# Patient Record
Sex: FEMALE | Race: BLACK OR AFRICAN AMERICAN | NOT HISPANIC OR LATINO | Employment: FULL TIME | URBAN - METROPOLITAN AREA
[De-identification: names, ages, dates, MRNs, and addresses within clinical notes are randomized per-mention and may not be internally consistent; named-entity substitution may affect disease eponyms.]

---

## 2020-12-02 ENCOUNTER — LAB VISIT (OUTPATIENT)
Dept: PRIMARY CARE CLINIC | Facility: OTHER | Age: 30
End: 2020-12-02
Attending: INTERNAL MEDICINE
Payer: COMMERCIAL

## 2020-12-02 DIAGNOSIS — Z03.818 ENCOUNTER FOR OBSERVATION FOR SUSPECTED EXPOSURE TO OTHER BIOLOGICAL AGENTS RULED OUT: ICD-10-CM

## 2020-12-02 PROCEDURE — U0003 INFECTIOUS AGENT DETECTION BY NUCLEIC ACID (DNA OR RNA); SEVERE ACUTE RESPIRATORY SYNDROME CORONAVIRUS 2 (SARS-COV-2) (CORONAVIRUS DISEASE [COVID-19]), AMPLIFIED PROBE TECHNIQUE, MAKING USE OF HIGH THROUGHPUT TECHNOLOGIES AS DESCRIBED BY CMS-2020-01-R: HCPCS

## 2020-12-03 LAB — SARS-COV-2 RNA RESP QL NAA+PROBE: NOT DETECTED

## 2020-12-04 ENCOUNTER — LAB VISIT (OUTPATIENT)
Dept: PRIMARY CARE CLINIC | Facility: OTHER | Age: 30
End: 2020-12-04
Attending: INTERNAL MEDICINE
Payer: OTHER GOVERNMENT

## 2020-12-04 DIAGNOSIS — Z03.818 ENCOUNTER FOR OBSERVATION FOR SUSPECTED EXPOSURE TO OTHER BIOLOGICAL AGENTS RULED OUT: ICD-10-CM

## 2020-12-04 PROCEDURE — U0003 INFECTIOUS AGENT DETECTION BY NUCLEIC ACID (DNA OR RNA); SEVERE ACUTE RESPIRATORY SYNDROME CORONAVIRUS 2 (SARS-COV-2) (CORONAVIRUS DISEASE [COVID-19]), AMPLIFIED PROBE TECHNIQUE, MAKING USE OF HIGH THROUGHPUT TECHNOLOGIES AS DESCRIBED BY CMS-2020-01-R: HCPCS

## 2020-12-07 LAB — SARS-COV-2 RNA RESP QL NAA+PROBE: NOT DETECTED

## 2021-01-13 ENCOUNTER — LAB VISIT (OUTPATIENT)
Dept: PRIMARY CARE CLINIC | Facility: OTHER | Age: 31
End: 2021-01-13
Attending: INTERNAL MEDICINE
Payer: OTHER GOVERNMENT

## 2021-01-13 DIAGNOSIS — Z20.822 ENCOUNTER FOR LABORATORY TESTING FOR COVID-19 VIRUS: ICD-10-CM

## 2021-01-13 PROCEDURE — U0003 INFECTIOUS AGENT DETECTION BY NUCLEIC ACID (DNA OR RNA); SEVERE ACUTE RESPIRATORY SYNDROME CORONAVIRUS 2 (SARS-COV-2) (CORONAVIRUS DISEASE [COVID-19]), AMPLIFIED PROBE TECHNIQUE, MAKING USE OF HIGH THROUGHPUT TECHNOLOGIES AS DESCRIBED BY CMS-2020-01-R: HCPCS

## 2021-01-14 LAB — SARS-COV-2 RNA RESP QL NAA+PROBE: NOT DETECTED

## 2021-03-30 ENCOUNTER — TELEPHONE (OUTPATIENT)
Dept: FAMILY MEDICINE | Facility: CLINIC | Age: 31
End: 2021-03-30

## 2021-03-30 DIAGNOSIS — Z79.899 ENCOUNTER FOR LONG-TERM (CURRENT) USE OF OTHER MEDICATIONS: Primary | ICD-10-CM

## 2021-03-30 DIAGNOSIS — Z13.89 SCREENING FOR BLOOD OR PROTEIN IN URINE: ICD-10-CM

## 2021-03-30 DIAGNOSIS — Z13.29 SCREENING FOR ENDOCRINE DISORDER: ICD-10-CM

## 2021-03-30 DIAGNOSIS — Z13.6 SCREENING FOR ISCHEMIC HEART DISEASE (IHD): ICD-10-CM

## 2021-04-29 ENCOUNTER — PATIENT MESSAGE (OUTPATIENT)
Dept: RESEARCH | Facility: HOSPITAL | Age: 31
End: 2021-04-29

## 2021-05-26 ENCOUNTER — TELEPHONE (OUTPATIENT)
Dept: FAMILY MEDICINE | Facility: CLINIC | Age: 31
End: 2021-05-26

## 2021-06-07 ENCOUNTER — OFFICE VISIT (OUTPATIENT)
Dept: FAMILY MEDICINE | Facility: CLINIC | Age: 31
End: 2021-06-07
Payer: COMMERCIAL

## 2021-06-07 VITALS
SYSTOLIC BLOOD PRESSURE: 124 MMHG | OXYGEN SATURATION: 100 % | BODY MASS INDEX: 32.99 KG/M2 | DIASTOLIC BLOOD PRESSURE: 80 MMHG | WEIGHT: 198 LBS | HEART RATE: 74 BPM | HEIGHT: 65 IN

## 2021-06-07 DIAGNOSIS — L30.9 ECZEMA, UNSPECIFIED TYPE: ICD-10-CM

## 2021-06-07 DIAGNOSIS — Z00.00 ANNUAL PHYSICAL EXAM: Primary | ICD-10-CM

## 2021-06-07 DIAGNOSIS — J30.1 SEASONAL ALLERGIC RHINITIS DUE TO POLLEN: ICD-10-CM

## 2021-06-07 DIAGNOSIS — Z76.89 ESTABLISHING CARE WITH NEW DOCTOR, ENCOUNTER FOR: ICD-10-CM

## 2021-06-07 PROCEDURE — 3008F PR BODY MASS INDEX (BMI) DOCUMENTED: ICD-10-PCS | Mod: S$GLB,,, | Performed by: FAMILY MEDICINE

## 2021-06-07 PROCEDURE — 3008F BODY MASS INDEX DOCD: CPT | Mod: S$GLB,,, | Performed by: FAMILY MEDICINE

## 2021-06-07 PROCEDURE — 99385 PR PREVENTIVE VISIT,NEW,18-39: ICD-10-PCS | Mod: S$GLB,,, | Performed by: FAMILY MEDICINE

## 2021-06-07 PROCEDURE — 99385 PREV VISIT NEW AGE 18-39: CPT | Mod: S$GLB,,, | Performed by: FAMILY MEDICINE

## 2022-01-10 ENCOUNTER — LAB VISIT (OUTPATIENT)
Dept: PRIMARY CARE CLINIC | Facility: OTHER | Age: 32
End: 2022-01-10
Attending: INTERNAL MEDICINE
Payer: COMMERCIAL

## 2022-01-10 DIAGNOSIS — Z20.822 ENCOUNTER FOR LABORATORY TESTING FOR COVID-19 VIRUS: ICD-10-CM

## 2022-01-10 PROCEDURE — U0003 INFECTIOUS AGENT DETECTION BY NUCLEIC ACID (DNA OR RNA); SEVERE ACUTE RESPIRATORY SYNDROME CORONAVIRUS 2 (SARS-COV-2) (CORONAVIRUS DISEASE [COVID-19]), AMPLIFIED PROBE TECHNIQUE, MAKING USE OF HIGH THROUGHPUT TECHNOLOGIES AS DESCRIBED BY CMS-2020-01-R: HCPCS | Performed by: INTERNAL MEDICINE

## 2022-01-11 LAB
SARS-COV-2 RNA RESP QL NAA+PROBE: NOT DETECTED
SARS-COV-2- CYCLE NUMBER: NORMAL

## 2022-06-06 ENCOUNTER — TELEPHONE (OUTPATIENT)
Dept: FAMILY MEDICINE | Facility: CLINIC | Age: 32
End: 2022-06-06

## 2022-06-06 DIAGNOSIS — Z00.00 ANNUAL PHYSICAL EXAM: Primary | ICD-10-CM

## 2022-06-06 DIAGNOSIS — E66.9 OBESITY, UNSPECIFIED CLASSIFICATION, UNSPECIFIED OBESITY TYPE, UNSPECIFIED WHETHER SERIOUS COMORBIDITY PRESENT: ICD-10-CM

## 2022-06-06 NOTE — TELEPHONE ENCOUNTER
----- Message from Jasmin Rowan sent at 2022  8:45 AM CDT -----  Pt calling she needs her labs to be ordered to do before her appt the current labs she thinks are  [facility] since it has been more than a year. She needs a call back so she knows when to come in [phone]

## 2022-06-08 LAB
ALBUMIN SERPL-MCNC: 4.3 G/DL (ref 3.6–5.1)
ALBUMIN/GLOB SERPL: 1.9 (CALC) (ref 1–2.5)
ALP SERPL-CCNC: 76 U/L (ref 31–125)
ALT SERPL-CCNC: 19 U/L (ref 6–29)
APPEARANCE UR: CLEAR
AST SERPL-CCNC: 18 U/L (ref 10–30)
BACTERIA #/AREA URNS HPF: NORMAL /HPF
BACTERIA UR CULT: NORMAL
BASOPHILS # BLD AUTO: 47 CELLS/UL (ref 0–200)
BASOPHILS NFR BLD AUTO: 0.6 %
BILIRUB SERPL-MCNC: 0.5 MG/DL (ref 0.2–1.2)
BILIRUB UR QL STRIP: NEGATIVE
BUN SERPL-MCNC: 14 MG/DL (ref 7–25)
BUN/CREAT SERPL: NORMAL (CALC) (ref 6–22)
CALCIUM SERPL-MCNC: 9 MG/DL (ref 8.6–10.2)
CHLORIDE SERPL-SCNC: 106 MMOL/L (ref 98–110)
CHOLEST SERPL-MCNC: 148 MG/DL
CHOLEST/HDLC SERPL: 1.9 (CALC)
CO2 SERPL-SCNC: 27 MMOL/L (ref 20–32)
COLOR UR: YELLOW
CREAT SERPL-MCNC: 0.75 MG/DL (ref 0.5–1.1)
EOSINOPHIL # BLD AUTO: 198 CELLS/UL (ref 15–500)
EOSINOPHIL NFR BLD AUTO: 2.5 %
ERYTHROCYTE [DISTWIDTH] IN BLOOD BY AUTOMATED COUNT: 12.7 % (ref 11–15)
GLOBULIN SER CALC-MCNC: 2.3 G/DL (CALC) (ref 1.9–3.7)
GLUCOSE SERPL-MCNC: 81 MG/DL (ref 65–99)
GLUCOSE UR QL STRIP: NEGATIVE
HCT VFR BLD AUTO: 41 % (ref 35–45)
HDLC SERPL-MCNC: 77 MG/DL
HGB BLD-MCNC: 13.3 G/DL (ref 11.7–15.5)
HGB UR QL STRIP: NEGATIVE
HYALINE CASTS #/AREA URNS LPF: NORMAL /LPF
KETONES UR QL STRIP: NEGATIVE
LDLC SERPL CALC-MCNC: 58 MG/DL (CALC)
LEUKOCYTE ESTERASE UR QL STRIP: NEGATIVE
LYMPHOCYTES # BLD AUTO: 1438 CELLS/UL (ref 850–3900)
LYMPHOCYTES NFR BLD AUTO: 18.2 %
MCH RBC QN AUTO: 29.6 PG (ref 27–33)
MCHC RBC AUTO-ENTMCNC: 32.4 G/DL (ref 32–36)
MCV RBC AUTO: 91.3 FL (ref 80–100)
MONOCYTES # BLD AUTO: 679 CELLS/UL (ref 200–950)
MONOCYTES NFR BLD AUTO: 8.6 %
NEUTROPHILS # BLD AUTO: 5538 CELLS/UL (ref 1500–7800)
NEUTROPHILS NFR BLD AUTO: 70.1 %
NITRITE UR QL STRIP: NEGATIVE
NONHDLC SERPL-MCNC: 71 MG/DL (CALC)
PH UR STRIP: 6 [PH] (ref 5–8)
PLATELET # BLD AUTO: 237 THOUSAND/UL (ref 140–400)
PMV BLD REES-ECKER: 9.3 FL (ref 7.5–12.5)
POTASSIUM SERPL-SCNC: 4 MMOL/L (ref 3.5–5.3)
PROT SERPL-MCNC: 6.6 G/DL (ref 6.1–8.1)
PROT UR QL STRIP: NEGATIVE
RBC # BLD AUTO: 4.49 MILLION/UL (ref 3.8–5.1)
RBC #/AREA URNS HPF: NORMAL /HPF
SODIUM SERPL-SCNC: 139 MMOL/L (ref 135–146)
SP GR UR STRIP: 1.01 (ref 1–1.03)
SQUAMOUS #/AREA URNS HPF: NORMAL /HPF
TRIGL SERPL-MCNC: 46 MG/DL
TSH SERPL-ACNC: 1.02 MIU/L
WBC # BLD AUTO: 7.9 THOUSAND/UL (ref 3.8–10.8)
WBC #/AREA URNS HPF: NORMAL /HPF

## 2022-06-09 ENCOUNTER — OFFICE VISIT (OUTPATIENT)
Dept: FAMILY MEDICINE | Facility: CLINIC | Age: 32
End: 2022-06-09
Payer: COMMERCIAL

## 2022-06-09 VITALS
OXYGEN SATURATION: 97 % | BODY MASS INDEX: 34.82 KG/M2 | HEART RATE: 88 BPM | SYSTOLIC BLOOD PRESSURE: 116 MMHG | HEIGHT: 65 IN | DIASTOLIC BLOOD PRESSURE: 74 MMHG | WEIGHT: 209 LBS

## 2022-06-09 DIAGNOSIS — L30.9 ECZEMA, UNSPECIFIED TYPE: ICD-10-CM

## 2022-06-09 DIAGNOSIS — J30.1 SEASONAL ALLERGIC RHINITIS DUE TO POLLEN: ICD-10-CM

## 2022-06-09 DIAGNOSIS — F43.22 ADJUSTMENT DISORDER WITH ANXIETY: ICD-10-CM

## 2022-06-09 DIAGNOSIS — M25.572 ACUTE LEFT ANKLE PAIN: ICD-10-CM

## 2022-06-09 DIAGNOSIS — Z00.00 ANNUAL PHYSICAL EXAM: Primary | ICD-10-CM

## 2022-06-09 PROCEDURE — 3074F PR MOST RECENT SYSTOLIC BLOOD PRESSURE < 130 MM HG: ICD-10-PCS | Mod: CPTII,S$GLB,, | Performed by: FAMILY MEDICINE

## 2022-06-09 PROCEDURE — 3074F SYST BP LT 130 MM HG: CPT | Mod: CPTII,S$GLB,, | Performed by: FAMILY MEDICINE

## 2022-06-09 PROCEDURE — 3078F PR MOST RECENT DIASTOLIC BLOOD PRESSURE < 80 MM HG: ICD-10-PCS | Mod: CPTII,S$GLB,, | Performed by: FAMILY MEDICINE

## 2022-06-09 PROCEDURE — 99395 PR PREVENTIVE VISIT,EST,18-39: ICD-10-PCS | Mod: S$GLB,,, | Performed by: FAMILY MEDICINE

## 2022-06-09 PROCEDURE — 1160F RVW MEDS BY RX/DR IN RCRD: CPT | Mod: CPTII,S$GLB,, | Performed by: FAMILY MEDICINE

## 2022-06-09 PROCEDURE — 99395 PREV VISIT EST AGE 18-39: CPT | Mod: S$GLB,,, | Performed by: FAMILY MEDICINE

## 2022-06-09 PROCEDURE — 3078F DIAST BP <80 MM HG: CPT | Mod: CPTII,S$GLB,, | Performed by: FAMILY MEDICINE

## 2022-06-09 PROCEDURE — 1160F PR REVIEW ALL MEDS BY PRESCRIBER/CLIN PHARMACIST DOCUMENTED: ICD-10-PCS | Mod: CPTII,S$GLB,, | Performed by: FAMILY MEDICINE

## 2022-06-09 PROCEDURE — 1159F MED LIST DOCD IN RCRD: CPT | Mod: CPTII,S$GLB,, | Performed by: FAMILY MEDICINE

## 2022-06-09 PROCEDURE — 3008F BODY MASS INDEX DOCD: CPT | Mod: CPTII,S$GLB,, | Performed by: FAMILY MEDICINE

## 2022-06-09 PROCEDURE — 3008F PR BODY MASS INDEX (BMI) DOCUMENTED: ICD-10-PCS | Mod: CPTII,S$GLB,, | Performed by: FAMILY MEDICINE

## 2022-06-09 PROCEDURE — 1159F PR MEDICATION LIST DOCUMENTED IN MEDICAL RECORD: ICD-10-PCS | Mod: CPTII,S$GLB,, | Performed by: FAMILY MEDICINE

## 2022-06-09 NOTE — PROGRESS NOTES
SUBJECTIVE:   HPI: Sheridan Ribera  is a 32 y.o. female who presents for annual physical .   Annual Exam (Is having a little anxiety, takes no rx meds// SW)    Pt here for annual exam.     Significant PMHx of eczema.    Family is causing her a lot of anxiety.  Helping take care of her grandma. Her JOHN is in town, planning on taking care of her grandmother. Has been sleeping ok. Not working right now, off for the summer. Teaches at [employer].     Eczema hasn't bothered her since she had children, though she occasionally has dry patches.     Needs to exercise and eat better.    Has occasional allergies, takes zyrtec as needed.     Had labs done: TSH 1.02, LDL 58    Pt hurt her left ankle about 2 months ago. The pain is getting better. Doesn't hurt when she walks, only when she step down in a certain way.  ------------------------------------------------------------------------  [facility] mom's sisters (2), ovarian cancer.    Telephone on 06/06/2022   Component Date Value Ref Range Status    WBC 06/07/2022 7.9  3.8 - 10.8 Thousand/uL Final    RBC 06/07/2022 4.49  3.80 - 5.10 Million/uL Final    Hemoglobin 06/07/2022 13.3  11.7 - 15.5 g/dL Final    Hematocrit 06/07/2022 41.0  35.0 - 45.0 % Final    MCV 06/07/2022 91.3  80.0 - 100.0 fL Final    MCH 06/07/2022 29.6  27.0 - 33.0 pg Final    MCHC 06/07/2022 32.4  32.0 - 36.0 g/dL Final    RDW 06/07/2022 12.7  11.0 - 15.0 % Final    Platelets 06/07/2022 237  140 - 400 Thousand/uL Final    MPV 06/07/2022 9.3  7.5 - 12.5 fL Final    Neutrophils, Abs 06/07/2022 5,538  1,500 - 7,800 cells/uL Final    Lymph # 06/07/2022 1,438  850 - 3,900 cells/uL Final    Mono # 06/07/2022 679  200 - 950 cells/uL Final    Eos # 06/07/2022 198  15 - 500 cells/uL Final    Baso # 06/07/2022 47  0 - 200 cells/uL Final    Neutrophils Relative 06/07/2022 70.1  % Final    Lymph % 06/07/2022 18.2  % Final    Mono % 06/07/2022 8.6  % Final    Eosinophil %  06/07/2022 2.5  % Final    Basophil % 06/07/2022 0.6  % Final    Glucose 06/07/2022 81  65 - 99 mg/dL Final    Comment:               Fasting reference interval         BUN 06/07/2022 14  7 - 25 mg/dL Final    Creatinine 06/07/2022 0.75  0.50 - 1.10 mg/dL Final    eGFR if non African American 06/07/2022 105  > OR = 60 mL/min/1.73m2 Final    eGFR if  06/07/2022 121  > OR = 60 mL/min/1.73m2 Final    BUN/Creatinine Ratio 06/07/2022 NOT APPLICABLE  6 - 22 (calc) Final    Sodium 06/07/2022 139  135 - 146 mmol/L Final    Potassium 06/07/2022 4.0  3.5 - 5.3 mmol/L Final    Chloride 06/07/2022 106  98 - 110 mmol/L Final    CO2 06/07/2022 27  20 - 32 mmol/L Final    Calcium 06/07/2022 9.0  8.6 - 10.2 mg/dL Final    Total Protein 06/07/2022 6.6  6.1 - 8.1 g/dL Final    Albumin 06/07/2022 4.3  3.6 - 5.1 g/dL Final    Globulin, Total 06/07/2022 2.3  1.9 - 3.7 g/dL (calc) Final    Albumin/Globulin Ratio 06/07/2022 1.9  1.0 - 2.5 (calc) Final    Total Bilirubin 06/07/2022 0.5  0.2 - 1.2 mg/dL Final    Alkaline Phosphatase 06/07/2022 76  31 - 125 U/L Final    AST 06/07/2022 18  10 - 30 U/L Final    ALT 06/07/2022 19  6 - 29 U/L Final    Cholesterol 06/07/2022 148  <200 mg/dL Final    HDL 06/07/2022 77  > OR = 50 mg/dL Final    Triglycerides 06/07/2022 46  <150 mg/dL Final    LDL Cholesterol 06/07/2022 58  mg/dL (calc) Final    Comment: Reference range: <100     Desirable range <100 mg/dL for primary prevention;    <70 mg/dL for patients with CHD or diabetic patients   with > or = 2 CHD risk factors.     LDL-C is now calculated using the Evan-Rowan   calculation, which is a validated novel method providing   better accuracy than the Friedewald equation in the   estimation of LDL-C.   Evan HUMPHREYS et al. MARGOT. 2013;310(19): 6354-0692   (http://education.Connequity.Google/faq/CJG520)      HDL/Cholesterol Ratio 06/07/2022 1.9  <5.0 (calc) Final    Non HDL Chol. (LDL+VLDL) 06/07/2022 71   <130 mg/dL (calc) Final    Comment: For patients with diabetes plus 1 major ASCVD risk   factor, treating to a non-HDL-C goal of <100 mg/dL   (LDL-C of <70 mg/dL) is considered a therapeutic   option.      TSH w/reflex to FT4 2022 1.02  mIU/L Final    Comment:           Reference Range                         > or = 20 Years  0.40-4.50                              Pregnancy Ranges            First trimester    0.26-2.66            Second trimester   0.55-2.73            Third trimester    0.43-2.91      Color, UA 2022 YELLOW  YELLOW Final    Appearance, UA 2022 CLEAR  CLEAR Final    Specific Gravity, UA 2022 1.009  1.001 - 1.035 Final    pH, UA 2022 6.0  5.0 - 8.0 Final    Glucose, UA 2022 NEGATIVE  NEGATIVE Final    Bilirubin, UA 2022 NEGATIVE  NEGATIVE Final    Ketones, UA 2022 NEGATIVE  NEGATIVE Final    Occult Blood UA 2022 NEGATIVE  NEGATIVE Final    Protein, UA 2022 NEGATIVE  NEGATIVE Final    Nitrite, UA 2022 NEGATIVE  NEGATIVE Final    Leukocytes, UA 2022 NEGATIVE  NEGATIVE Final    WBC Casts, UA 2022 NONE SEEN  < OR = 5 /HPF Final    RBC Casts, UA 2022 NONE SEEN  < OR = 2 /HPF Final    Squam Epithel, UA 2022 NONE SEEN  < OR = 5 /HPF Final    Bacteria, UA 2022 NONE SEEN  NONE SEEN /HPF Final    Hyaline Casts, UA 2022 NONE SEEN  NONE SEEN /LPF Final    Reflexive Urine Culture 2022    Final    NO CULTURE INDICATED       Review of patient's allergies indicates:  No Known Allergies  No current outpatient medications on file prior to visit.     No current facility-administered medications on file prior to visit.     History reviewed. No pertinent past medical history.  Past Surgical History:   Procedure Laterality Date     SECTION       Family History   Problem Relation Age of Onset    Hyperlipidemia Mother     Hypertension Mother     Hyperlipidemia Father     Hypertension  "Father     COPD Father     Breast cancer Maternal Aunt      Social History     Tobacco Use    Smoking status: Never Smoker    Smokeless tobacco: Never Used   Substance Use Topics    Alcohol use: Yes     Comment: occas    Drug use: No      Health Maintenance Topics with due status: Not Due       Topic Last Completion Date    Cervical Cancer Screening 08/09/2021    Influenza Vaccine Not Due     Immunization History   Administered Date(s) Administered    DTaP (5 Pertussis Antigens) 10/31/1989, 08/06/1990, 02/03/1992, 06/24/1992, 09/03/1992    HIB 08/08/2000    Hepatitis B 08/08/2000, 10/25/2000    IPV 10/31/1989, 08/06/1990, 06/24/1992, 09/03/1992    MMR 08/06/1990, 09/03/1992       Review of Systems   Constitutional: Negative for appetite change, fatigue, fever and unexpected weight change.   Respiratory: Negative for cough, chest tightness, shortness of breath and wheezing.    Cardiovascular: Negative for chest pain and leg swelling.   Gastrointestinal: Negative for abdominal pain, constipation, nausea and vomiting.        -heartburn   Genitourinary: Negative for difficulty urinating, dysuria, frequency and urgency.   Musculoskeletal: Negative for arthralgias, back pain, myalgias and neck pain.        + left ankle pain   Skin: Negative for rash.   Neurological: Negative for dizziness, weakness, numbness and headaches (sometimes around her menses).   Hematological: Does not bruise/bleed easily.   Psychiatric/Behavioral: Negative for dysphoric mood, sleep disturbance and suicidal ideas. The patient is nervous/anxious.    All other systems reviewed and are negative.     OBJECTIVE:      Vitals:    06/09/22 1507   BP: 116/74   Pulse: 88   SpO2: 97%   Weight: 94.8 kg (209 lb)   Height: 5' 5" (1.651 m)     Wt Readings from Last 3 Encounters:   06/09/22 94.8 kg (209 lb)   06/07/21 89.8 kg (198 lb)   06/02/14 89.1 kg (196 lb 6.4 oz)       Body mass index is 34.78 kg/m².  Physical Exam  Vitals reviewed. "   Constitutional:       General: She is not in acute distress.     Appearance: Normal appearance. She is well-developed.   HENT:      Head: Normocephalic and atraumatic.   Neck:      Thyroid: No thyromegaly.   Cardiovascular:      Rate and Rhythm: Normal rate and regular rhythm.      Heart sounds: Normal heart sounds. No murmur heard.    No friction rub.   Pulmonary:      Effort: Pulmonary effort is normal.      Breath sounds: Normal breath sounds. No wheezing or rales.   Abdominal:      General: Bowel sounds are normal. There is no distension.      Palpations: Abdomen is soft.      Tenderness: There is no abdominal tenderness.   Musculoskeletal:      Cervical back: Neck supple.      Left ankle: Tenderness (below lateral malleolus) present.   Lymphadenopathy:      Cervical: No cervical adenopathy.   Skin:     General: Skin is warm and dry.      Findings: No rash.   Neurological:      Mental Status: She is alert and oriented to person, place, and time.   Psychiatric:         Attention and Perception: She is attentive.         Speech: Speech normal.         Behavior: Behavior normal.         Thought Content: Thought content normal.         Judgment: Judgment normal.        Assessment:       1. Annual physical exam    2. Adjustment disorder with anxiety    3. Seasonal allergic rhinitis due to pollen    4. Eczema, unspecified type    5. Acute left ankle pain        Plan:       Annual physical exam    Adjustment disorder with anxiety  Comments:  Controlled.  Will continue to monitor symptoms. Recommend counseling    Seasonal allergic rhinitis due to pollen  Comments:  Controlled. Will continue to monitor symptoms.    Eczema, unspecified type  Comments:  Controlled. Will continue to monitor symptoms    Acute left ankle pain  Comments:  Improving. To wear supportive shoes. Exprect resolution. If persists, may have to see Ortho    Other  Lab results discussed and reviewed with patient.    Counseled on age and gender  appropriate medical preventative services, including cancer screenings, immunizations, overall nutritional health, need for a consistent exercise regimen and an overall push towards maintaining a vigorous and active lifestyle.      Follow up in about 6 months (around 12/9/2022) for Allergies, Eczema, Anxiety.

## 2023-07-12 ENCOUNTER — OFFICE VISIT (OUTPATIENT)
Dept: URGENT CARE | Facility: CLINIC | Age: 33
End: 2023-07-12
Payer: COMMERCIAL

## 2023-07-12 VITALS
BODY MASS INDEX: 35.49 KG/M2 | HEART RATE: 90 BPM | DIASTOLIC BLOOD PRESSURE: 79 MMHG | OXYGEN SATURATION: 100 % | TEMPERATURE: 98 F | SYSTOLIC BLOOD PRESSURE: 129 MMHG | WEIGHT: 213 LBS | HEIGHT: 65 IN | RESPIRATION RATE: 16 BRPM

## 2023-07-12 DIAGNOSIS — T14.8XXA SKIN AVULSION: Primary | ICD-10-CM

## 2023-07-12 PROCEDURE — 99203 OFFICE O/P NEW LOW 30 MIN: CPT | Mod: 25,S$GLB,,

## 2023-07-12 PROCEDURE — 90715 TDAP VACCINE 7 YRS/> IM: CPT | Mod: S$GLB,,,

## 2023-07-12 PROCEDURE — 90471 IMMUNIZATION ADMIN: CPT | Mod: S$GLB,,,

## 2023-07-12 PROCEDURE — 90471 TDAP VACCINE GREATER THAN OR EQUAL TO 7YO IM: ICD-10-PCS | Mod: S$GLB,,,

## 2023-07-12 PROCEDURE — 99203 PR OFFICE/OUTPT VISIT, NEW, LEVL III, 30-44 MIN: ICD-10-PCS | Mod: 25,S$GLB,,

## 2023-07-12 PROCEDURE — 90715 TDAP VACCINE GREATER THAN OR EQUAL TO 7YO IM: ICD-10-PCS | Mod: S$GLB,,,

## 2023-07-12 RX ORDER — MUPIROCIN 20 MG/G
OINTMENT TOPICAL 3 TIMES DAILY
Qty: 15 G | Refills: 0 | Status: SHIPPED | OUTPATIENT
Start: 2023-07-12 | End: 2023-07-19

## 2023-07-12 NOTE — PROGRESS NOTES
"Subjective:      Patient ID: Sheridan Ribera is a 33 y.o. female.    Vitals:  height is 5' 5" (1.651 m) and weight is 96.6 kg (213 lb). Her temperature is 98.3 °F (36.8 °C). Her blood pressure is 129/79 and her pulse is 90. Her respiration is 16 and oxygen saturation is 100%.     Chief Complaint: Laceration    Ms. Hoover, presents to clinic with a chief complaint of a skin avulsion to right lateral thumb.  She her finger on a dish while cleaning dishes at home.  Bleeding well controlled prior to arrival.  Patient had thumb dressed with to Band-Aids with serosanguineous drainage. Tetanus immunization not up-to-date.  Finger was soaked in Betadine with normal saline, and then covered with mupirocin and Band-Aid.  Wound care instructions given to patient told to avoid soaking finger and washing dishes until scabbed over.    Laceration   Incident onset: today. Pain location: right thumb. The laceration mechanism was a broken glass. The pain is at a severity of 1/10. She reports no foreign bodies present. Her tetanus status is out of date.     Constitution: Negative for fever.   HENT:  Negative for sore throat.    Cardiovascular:  Negative for palpitations.   Eyes:  Negative for eye pain.   Respiratory:  Negative for cough.    Gastrointestinal:  Negative for nausea, vomiting and diarrhea.   Endocrine: cold intolerance and heat intolerance.   Genitourinary:  Negative for dysuria and frequency.   Musculoskeletal:  Negative for joint pain.   Skin:  Positive for avulsion.   Allergic/Immunologic: Negative for environmental allergies, seasonal allergies and immunizations up-to-date.   Neurological:  Negative for altered mental status.   Hematologic/Lymphatic: Negative for easy bruising/bleeding. Does not bruise/bleed easily.   Psychiatric/Behavioral:  Negative for altered mental status.     Objective:     Physical Exam   Constitutional: She is oriented to person, place, and time. She appears well-developed. She is " cooperative.  Non-toxic appearance. She does not appear ill. No distress. obesity  HENT:   Head: Normocephalic and atraumatic.   Ears:   Right Ear: Hearing and external ear normal.   Left Ear: Hearing and external ear normal.   Nose: Nose normal. No mucosal edema or nasal deformity. No epistaxis. Right sinus exhibits no maxillary sinus tenderness and no frontal sinus tenderness. Left sinus exhibits no maxillary sinus tenderness and no frontal sinus tenderness.   Mouth/Throat: Uvula is midline, oropharynx is clear and moist and mucous membranes are normal. Mucous membranes are moist. No trismus in the jaw. Normal dentition. No uvula swelling. Oropharynx is clear.   Eyes: Conjunctivae and lids are normal. Pupils are equal, round, and reactive to light. Extraocular movement intact   Neck: Trachea normal and phonation normal. Neck supple.   Cardiovascular: Normal rate, regular rhythm, normal heart sounds and normal pulses.   Pulmonary/Chest: Effort normal and breath sounds normal.   Abdominal: Normal appearance.   Musculoskeletal: Normal range of motion.         General: Normal range of motion.      Right hand: Right thumb: Exhibits bleeding. Right thumb - injuries: Avulsion.   Neurological: no focal deficit. She is alert and oriented to person, place, and time. She exhibits normal muscle tone.   Skin: Skin is warm, dry, intact and not diaphoretic. Capillary refill takes 2 to 3 seconds.   Psychiatric: Her speech is normal and behavior is normal. Mood, judgment and thought content normal.   Nursing note and vitals reviewed.    Assessment:     1. Skin avulsion          Plan:       Skin avulsion  -     (In Office Administered) Tdap Vaccine  -     mupirocin (BACTROBAN) 2 % ointment; Apply topically 3 (three) times daily. for 7 days  Dispense: 15 g; Refill: 0

## 2023-07-12 NOTE — PATIENT INSTRUCTIONS
Follow-up with primary care provider for any signs and symptoms of an infection.  This would include area being warm to touch, reddened, purulent (pus) discharge, etc.   Change dressing at least 3 times a day and apply mupirocin ointment.  Only applying ointment to make the skin appear wet.  Do not try to express (squeeze), or use hydrogen peroxide.  Do not let finger soak.  If you develop a fever, or notice red streaking going up your arm,  or get confused please go to the nearest emergency room.

## 2023-07-24 ENCOUNTER — PATIENT MESSAGE (OUTPATIENT)
Dept: RESEARCH | Facility: HOSPITAL | Age: 33
End: 2023-07-24
Payer: COMMERCIAL

## 2023-07-31 ENCOUNTER — PATIENT MESSAGE (OUTPATIENT)
Dept: RESEARCH | Facility: HOSPITAL | Age: 33
End: 2023-07-31
Payer: COMMERCIAL

## 2024-01-31 ENCOUNTER — OFFICE VISIT (OUTPATIENT)
Dept: FAMILY MEDICINE | Facility: CLINIC | Age: 34
End: 2024-01-31
Payer: COMMERCIAL

## 2024-01-31 VITALS
HEIGHT: 65 IN | SYSTOLIC BLOOD PRESSURE: 122 MMHG | HEART RATE: 76 BPM | WEIGHT: 207 LBS | BODY MASS INDEX: 34.49 KG/M2 | DIASTOLIC BLOOD PRESSURE: 82 MMHG

## 2024-01-31 DIAGNOSIS — Z80.3 FAMILY HISTORY OF BREAST CANCER IN FEMALE: ICD-10-CM

## 2024-01-31 DIAGNOSIS — Z12.31 SCREENING MAMMOGRAM, ENCOUNTER FOR: ICD-10-CM

## 2024-01-31 DIAGNOSIS — Z79.899 LONG-TERM USE OF HIGH-RISK MEDICATION: ICD-10-CM

## 2024-01-31 DIAGNOSIS — Z63.8 CAREGIVER ROLE STRAIN: ICD-10-CM

## 2024-01-31 DIAGNOSIS — Z00.00 ANNUAL PHYSICAL EXAM: Primary | ICD-10-CM

## 2024-01-31 PROCEDURE — 99395 PREV VISIT EST AGE 18-39: CPT | Mod: S$GLB,,, | Performed by: FAMILY MEDICINE

## 2024-01-31 PROCEDURE — 3008F BODY MASS INDEX DOCD: CPT | Mod: CPTII,S$GLB,, | Performed by: FAMILY MEDICINE

## 2024-01-31 PROCEDURE — 1160F RVW MEDS BY RX/DR IN RCRD: CPT | Mod: CPTII,S$GLB,, | Performed by: FAMILY MEDICINE

## 2024-01-31 PROCEDURE — 3074F SYST BP LT 130 MM HG: CPT | Mod: CPTII,S$GLB,, | Performed by: FAMILY MEDICINE

## 2024-01-31 PROCEDURE — 3079F DIAST BP 80-89 MM HG: CPT | Mod: CPTII,S$GLB,, | Performed by: FAMILY MEDICINE

## 2024-01-31 PROCEDURE — 1159F MED LIST DOCD IN RCRD: CPT | Mod: CPTII,S$GLB,, | Performed by: FAMILY MEDICINE

## 2024-01-31 SDOH — SOCIAL DETERMINANTS OF HEALTH (SDOH): OTHER SPECIFIED PROBLEMS RELATED TO PRIMARY SUPPORT GROUP: Z63.8

## 2024-01-31 NOTE — PROGRESS NOTES
SUBJECTIVE:   HPI: Sheridan Ribera  is a 33 y.o. female who presents for annual physical .   Annual Exam (Annual wellness exam//declined flu vac//would like mammogram ordered//tc)    Pt here for annual exam.     Significant PMHx of eczema.    Anxiety remains, she just pushes through.  No panic attack in a really long time. Taking care of her grandmother that lives in a senior living home. 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     Eczema is doing ok. Though she occasionally has dry patches.     Exercises off and on, walks on the treadmill or with her mom.  Had gotten up to 217, and then lost down to 207 lb.     Has occasional allergies, takes zyrtec as needed.     No recent labs.     ------------------------------------------------------------------------  [facility], mom's sisters (2), ovarian cancer. Pt would like to get baseline mammogram now that she is 33.           Review of patient's allergies indicates:  No Known Allergies  No current outpatient medications on file prior to visit.     No current facility-administered medications on file prior to visit.     History reviewed. No pertinent past medical history.  Past Surgical History:   Procedure Laterality Date     SECTION       Family History   Problem Relation Age of Onset    Hyperlipidemia Mother     Hypertension Mother     Hyperlipidemia Father     Hypertension Father     COPD Father     Breast cancer Maternal Aunt      Social History     Tobacco Use    Smoking status: Never    Smokeless tobacco: Never   Substance Use Topics    Alcohol use: Yes     Comment: occas    Drug use: No      Health Maintenance Topics with due status: Not Due       Topic Last Completion Date    TETANUS VACCINE 2023    Cervical Cancer Screening 10/25/2023     Immunization History   Administered Date(s) Administered    COVID-19, MRNA, LN-S, PF (Pfizer) (Purple Cap) 2021, 2021    DTaP (5 Pertussis Antigens) 10/31/1989, 1990,  "02/03/1992, 06/24/1992, 09/03/1992    HIB 08/08/2000    Hepatitis B 08/08/2000, 10/25/2000    IPV 10/31/1989, 08/06/1990, 06/24/1992, 09/03/1992    MMR 08/06/1990, 09/03/1992    Tdap 07/12/2023       Review of Systems   Constitutional:  Negative for appetite change, fatigue, fever and unexpected weight change.   Respiratory:  Negative for cough, chest tightness, shortness of breath and wheezing.    Cardiovascular:  Negative for chest pain and leg swelling.   Gastrointestinal:  Negative for abdominal pain, constipation, nausea and vomiting.        -heartburn   Genitourinary:  Negative for difficulty urinating, dysuria, frequency and urgency.   Musculoskeletal:  Negative for arthralgias, back pain, myalgias and neck pain.        + left ankle pain   Skin:  Negative for rash.   Neurological:  Negative for dizziness, weakness, numbness and headaches (sometimes around her menses).   Hematological:  Does not bruise/bleed easily.   Psychiatric/Behavioral:  Negative for dysphoric mood, sleep disturbance and suicidal ideas. The patient is nervous/anxious.    All other systems reviewed and are negative.     OBJECTIVE:      Vitals:    01/31/24 1509   BP: 122/82   Pulse: 76   Weight: 93.9 kg (207 lb)   Height: 5' 5" (1.651 m)       Wt Readings from Last 3 Encounters:   01/31/24 93.9 kg (207 lb)   07/12/23 96.6 kg (213 lb)   06/09/22 94.8 kg (209 lb)       Body mass index is 34.45 kg/m².    Physical Exam  Vitals reviewed.   Constitutional:       General: She is not in acute distress.     Appearance: Normal appearance. She is well-developed.   HENT:      Head: Normocephalic and atraumatic.   Neck:      Thyroid: No thyromegaly.   Cardiovascular:      Rate and Rhythm: Normal rate and regular rhythm.      Heart sounds: Normal heart sounds. No murmur heard.     No friction rub.   Pulmonary:      Effort: Pulmonary effort is normal.      Breath sounds: Normal breath sounds. No wheezing or rales.   Abdominal:      General: Bowel sounds " are normal. There is no distension.      Palpations: Abdomen is soft.      Tenderness: There is no abdominal tenderness.   Musculoskeletal:      Cervical back: Neck supple.   Lymphadenopathy:      Cervical: No cervical adenopathy.   Skin:     General: Skin is warm and dry.      Findings: No rash.   Neurological:      Mental Status: She is alert and oriented to person, place, and time.   Psychiatric:         Attention and Perception: She is attentive.         Speech: Speech normal.         Behavior: Behavior normal.         Thought Content: Thought content normal.         Judgment: Judgment normal.        Assessment:       1. Annual physical exam    2. Family history of breast cancer in female    3. Caregiver role strain    4. Screening mammogram, encounter for    5. Long-term use of high-risk medication          Plan:       Annual physical exam  -     TSH w/reflex to FT4; Future; Expected date: 01/31/2024  -     Urinalysis, Reflex to Urine Culture Urine, Clean Catch; Future; Expected date: 01/31/2024  -     CBC Auto Differential; Future; Expected date: 01/31/2024  -     Lipid Panel; Future; Expected date: 01/31/2024  -     Comprehensive Metabolic Panel; Future; Expected date: 01/31/2024    Family history of breast cancer in female  Comments:  Will order baseline mammogram due to family history.  Orders:  -     Mammo Digital Screening Bilat w/ Terry; Future; Expected date: 01/31/2024    Caregiver role strain    Screening mammogram, encounter for  -     Mammo Digital Screening Bilat w/ Terry; Future; Expected date: 01/31/2024    Long-term use of high-risk medication  -     TSH w/reflex to FT4; Future; Expected date: 01/31/2024  -     Urinalysis, Reflex to Urine Culture Urine, Clean Catch; Future; Expected date: 01/31/2024  -     CBC Auto Differential; Future; Expected date: 01/31/2024  -     Lipid Panel; Future; Expected date: 01/31/2024  -     Comprehensive Metabolic Panel; Future; Expected date: 01/31/2024      Labs  and/or tests have been ordered for the evaluation/monitoring of acute/chronic conditions, to be done just before next visit.    Counseled on age and gender appropriate medical preventative services, including cancer screenings, immunizations, overall nutritional health, need for a consistent exercise regimen and an overall push towards maintaining a vigorous and active lifestyle.      Follow up in about 1 year (around 1/31/2025) for Annual, LABS.

## 2024-02-17 LAB
ALBUMIN SERPL-MCNC: 4.2 G/DL (ref 3.6–5.1)
ALBUMIN/GLOB SERPL: 1.7 (CALC) (ref 1–2.5)
ALP SERPL-CCNC: 75 U/L (ref 31–125)
ALT SERPL-CCNC: 29 U/L (ref 6–29)
APPEARANCE UR: CLEAR
AST SERPL-CCNC: 26 U/L (ref 10–30)
BACTERIA #/AREA URNS HPF: NORMAL /HPF
BACTERIA UR CULT: NORMAL
BASOPHILS # BLD AUTO: 51 CELLS/UL (ref 0–200)
BASOPHILS NFR BLD AUTO: 0.7 %
BILIRUB SERPL-MCNC: 0.6 MG/DL (ref 0.2–1.2)
BILIRUB UR QL STRIP: NEGATIVE
BUN SERPL-MCNC: 9 MG/DL (ref 7–25)
BUN/CREAT SERPL: NORMAL (CALC) (ref 6–22)
CALCIUM SERPL-MCNC: 9.2 MG/DL (ref 8.6–10.2)
CHLORIDE SERPL-SCNC: 103 MMOL/L (ref 98–110)
CHOLEST SERPL-MCNC: 140 MG/DL
CHOLEST/HDLC SERPL: 1.6 (CALC)
CO2 SERPL-SCNC: 27 MMOL/L (ref 20–32)
COLOR UR: YELLOW
CREAT SERPL-MCNC: 0.6 MG/DL (ref 0.5–0.97)
EGFR: 120 ML/MIN/1.73M2
EOSINOPHIL # BLD AUTO: 307 CELLS/UL (ref 15–500)
EOSINOPHIL NFR BLD AUTO: 4.2 %
ERYTHROCYTE [DISTWIDTH] IN BLOOD BY AUTOMATED COUNT: 12.7 % (ref 11–15)
GLOBULIN SER CALC-MCNC: 2.5 G/DL (CALC) (ref 1.9–3.7)
GLUCOSE SERPL-MCNC: 79 MG/DL (ref 65–99)
GLUCOSE UR QL STRIP: NEGATIVE
HCT VFR BLD AUTO: 42.6 % (ref 35–45)
HDLC SERPL-MCNC: 87 MG/DL
HGB BLD-MCNC: 13.8 G/DL (ref 11.7–15.5)
HGB UR QL STRIP: NEGATIVE
HYALINE CASTS #/AREA URNS LPF: NORMAL /LPF
KETONES UR QL STRIP: NEGATIVE
LDLC SERPL CALC-MCNC: 39 MG/DL (CALC)
LEUKOCYTE ESTERASE UR QL STRIP: NEGATIVE
LYMPHOCYTES # BLD AUTO: 1445 CELLS/UL (ref 850–3900)
LYMPHOCYTES NFR BLD AUTO: 19.8 %
MCH RBC QN AUTO: 28.6 PG (ref 27–33)
MCHC RBC AUTO-ENTMCNC: 32.4 G/DL (ref 32–36)
MCV RBC AUTO: 88.4 FL (ref 80–100)
MONOCYTES # BLD AUTO: 708 CELLS/UL (ref 200–950)
MONOCYTES NFR BLD AUTO: 9.7 %
NEUTROPHILS # BLD AUTO: 4789 CELLS/UL (ref 1500–7800)
NEUTROPHILS NFR BLD AUTO: 65.6 %
NITRITE UR QL STRIP: NEGATIVE
NONHDLC SERPL-MCNC: 53 MG/DL (CALC)
PH UR STRIP: 7.5 [PH] (ref 5–8)
PLATELET # BLD AUTO: 272 THOUSAND/UL (ref 140–400)
PMV BLD REES-ECKER: 9.5 FL (ref 7.5–12.5)
POTASSIUM SERPL-SCNC: 4.2 MMOL/L (ref 3.5–5.3)
PROT SERPL-MCNC: 6.7 G/DL (ref 6.1–8.1)
PROT UR QL STRIP: NEGATIVE
RBC # BLD AUTO: 4.82 MILLION/UL (ref 3.8–5.1)
RBC #/AREA URNS HPF: NORMAL /HPF
SERVICE CMNT-IMP: NORMAL
SODIUM SERPL-SCNC: 139 MMOL/L (ref 135–146)
SP GR UR STRIP: 1 (ref 1–1.03)
SQUAMOUS #/AREA URNS HPF: NORMAL /HPF
TRIGL SERPL-MCNC: 48 MG/DL
TSH SERPL-ACNC: 0.67 MIU/L
WBC # BLD AUTO: 7.3 THOUSAND/UL (ref 3.8–10.8)
WBC #/AREA URNS HPF: NORMAL /HPF

## 2024-03-20 ENCOUNTER — HOSPITAL ENCOUNTER (OUTPATIENT)
Dept: RADIOLOGY | Facility: CLINIC | Age: 34
Discharge: HOME | End: 2024-03-20
Attending: FAMILY MEDICINE
Payer: COMMERCIAL

## 2024-03-20 DIAGNOSIS — Z12.31 SCREENING MAMMOGRAM, ENCOUNTER FOR: ICD-10-CM

## 2024-03-20 DIAGNOSIS — Z80.3 FAMILY HISTORY OF BREAST CANCER IN FEMALE: ICD-10-CM

## 2024-03-20 PROCEDURE — 77067 SCR MAMMO BI INCL CAD: CPT | Mod: TC,PO

## 2024-03-20 PROCEDURE — 77067 SCR MAMMO BI INCL CAD: CPT | Mod: 26,,, | Performed by: RADIOLOGY

## 2024-03-20 PROCEDURE — 77063 BREAST TOMOSYNTHESIS BI: CPT | Mod: 26,,, | Performed by: RADIOLOGY

## 2025-01-27 ENCOUNTER — TELEPHONE (OUTPATIENT)
Dept: FAMILY MEDICINE | Facility: CLINIC | Age: 35
End: 2025-01-27
Payer: COMMERCIAL

## 2025-01-27 DIAGNOSIS — Z00.00 ROUTINE GENERAL MEDICAL EXAMINATION AT A HEALTH CARE FACILITY: Primary | ICD-10-CM

## 2025-01-27 DIAGNOSIS — Z79.899 ENCOUNTER FOR LONG-TERM (CURRENT) USE OF MEDICATIONS: ICD-10-CM

## 2025-01-27 NOTE — TELEPHONE ENCOUNTER
----- Message from Xenia sent at 1/27/2025 10:42 AM CST -----  Pt would like her annual labs   [facility]   [phone]

## 2025-02-02 LAB
ALBUMIN SERPL-MCNC: 4.5 G/DL (ref 3.6–5.1)
ALBUMIN/GLOB SERPL: 1.7 (CALC) (ref 1–2.5)
ALP SERPL-CCNC: 105 U/L (ref 31–125)
ALT SERPL-CCNC: 16 U/L (ref 6–29)
AST SERPL-CCNC: 16 U/L (ref 10–30)
BASOPHILS # BLD AUTO: 60 CELLS/UL (ref 0–200)
BASOPHILS NFR BLD AUTO: 0.7 %
BILIRUB SERPL-MCNC: 0.8 MG/DL (ref 0.2–1.2)
BUN SERPL-MCNC: 9 MG/DL (ref 7–25)
BUN/CREAT SERPL: NORMAL (CALC) (ref 6–22)
CALCIUM SERPL-MCNC: 9.7 MG/DL (ref 8.6–10.2)
CHLORIDE SERPL-SCNC: 101 MMOL/L (ref 98–110)
CHOLEST SERPL-MCNC: 142 MG/DL
CHOLEST/HDLC SERPL: 1.8 (CALC)
CO2 SERPL-SCNC: 28 MMOL/L (ref 20–32)
CREAT SERPL-MCNC: 0.72 MG/DL (ref 0.5–0.97)
EGFR: 111 ML/MIN/1.73M2
EOSINOPHIL # BLD AUTO: 249 CELLS/UL (ref 15–500)
EOSINOPHIL NFR BLD AUTO: 2.9 %
ERYTHROCYTE [DISTWIDTH] IN BLOOD BY AUTOMATED COUNT: 12.9 % (ref 11–15)
GLOBULIN SER CALC-MCNC: 2.6 G/DL (CALC) (ref 1.9–3.7)
GLUCOSE SERPL-MCNC: 82 MG/DL (ref 65–99)
HCT VFR BLD AUTO: 45 % (ref 35–45)
HDLC SERPL-MCNC: 79 MG/DL
HGB BLD-MCNC: 14.2 G/DL (ref 11.7–15.5)
LDLC SERPL CALC-MCNC: 49 MG/DL (CALC)
LYMPHOCYTES # BLD AUTO: 1651 CELLS/UL (ref 850–3900)
LYMPHOCYTES NFR BLD AUTO: 19.2 %
MCH RBC QN AUTO: 28.1 PG (ref 27–33)
MCHC RBC AUTO-ENTMCNC: 31.6 G/DL (ref 32–36)
MCV RBC AUTO: 88.9 FL (ref 80–100)
MONOCYTES # BLD AUTO: 937 CELLS/UL (ref 200–950)
MONOCYTES NFR BLD AUTO: 10.9 %
NEUTROPHILS # BLD AUTO: 5702 CELLS/UL (ref 1500–7800)
NEUTROPHILS NFR BLD AUTO: 66.3 %
NONHDLC SERPL-MCNC: 63 MG/DL (CALC)
PLATELET # BLD AUTO: 296 THOUSAND/UL (ref 140–400)
PMV BLD REES-ECKER: 9.7 FL (ref 7.5–12.5)
POTASSIUM SERPL-SCNC: 4.1 MMOL/L (ref 3.5–5.3)
PROT SERPL-MCNC: 7.1 G/DL (ref 6.1–8.1)
RBC # BLD AUTO: 5.06 MILLION/UL (ref 3.8–5.1)
SODIUM SERPL-SCNC: 139 MMOL/L (ref 135–146)
TRIGL SERPL-MCNC: 61 MG/DL
TSH SERPL-ACNC: 0.41 MIU/L
WBC # BLD AUTO: 8.6 THOUSAND/UL (ref 3.8–10.8)

## 2025-02-02 NOTE — PROGRESS NOTES
SUBJECTIVE:   HPI: Sheridan Ribera  is a 34 y.o. female who presents for annual physical .   Annual Exam (Annual exam and lab results//no med bottles//declined flu vac//tc)    Pt here for annual exam.     Significant PMHx of eczema.    Anxiety is doing much better. Thinks she gets anxiety with things that are out of her control.  Thinks she might have a hint of ADD but she would not want to take anything.   No panic attack in a really long time.  Taking care of her grandmother that lives in a [facility] home.      Eczema is doing ok.     Has a lesion on her upper left arm that started out as a small bump, that she picked with, and now has left a slightly larger bump.      Exercises, walks with her  4 days a week, about 20-30 minutes.  Has gained some weight since last visit. Having trouble losing weight no matter what she does. Interested in GLP-1, but wants to check with GI first to get their opinion on it because of her hx anal fissure.     Has occasional allergies, takes zyrtec as needed. Which she hasn't taken in a while, since November. Has to take when the weather changes.      Takes psyllium husk regularly due to history of anal fissures. Sometimes will drink metamucil.     Had labs done: TSH 0.41, fBS 82, , LDL 49    ------------------------------------------------------------------------  [facility] mom's sisters (2), ovarian cancer. Pt would like to get baseline mammogram now that she is 33.   ([place]) 2/19/25, has been having some bloating and trouble losing weight.   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      Results for orders placed or performed in visit on 01/27/25   CBC Auto Differential    Collection Time: 02/01/25 10:08 AM   Result Value Ref Range    WBC 8.6 3.8 - 10.8 Thousand/uL    RBC 5.06 3.80 - 5.10 Million/uL    Hemoglobin 14.2 11.7 - 15.5 g/dL    Hematocrit 45.0 35.0 - 45.0 %    MCV 88.9 80.0 - 100.0 fL    MCH 28.1 27.0 - 33.0 pg    MCHC 31.6  (L) 32.0 - 36.0 g/dL    RDW 12.9 11.0 - 15.0 %    Platelets 296 140 - 400 Thousand/uL    MPV 9.7 7.5 - 12.5 fL    Neutrophils, Abs 5,702 1,500 - 7,800 cells/uL    Lymph # 1,651 850 - 3,900 cells/uL    Mono # 937 200 - 950 cells/uL    Eos # 249 15 - 500 cells/uL    Baso # 60 0 - 200 cells/uL    Neutrophils Relative 66.3 %    Lymph % 19.2 %    Mono % 10.9 %    Eosinophil % 2.9 %    Basophil % 0.7 %   Comprehensive Metabolic Panel    Collection Time: 02/01/25 10:08 AM   Result Value Ref Range    Glucose 82 65 - 99 mg/dL    BUN 9 7 - 25 mg/dL    Creatinine 0.72 0.50 - 0.97 mg/dL    eGFR 111 > OR = 60 mL/min/1.73m2    BUN/Creatinine Ratio SEE NOTE: 6 - 22 (calc)    Sodium 139 135 - 146 mmol/L    Potassium 4.1 3.5 - 5.3 mmol/L    Chloride 101 98 - 110 mmol/L    CO2 28 20 - 32 mmol/L    Calcium 9.7 8.6 - 10.2 mg/dL    Total Protein 7.1 6.1 - 8.1 g/dL    Albumin 4.5 3.6 - 5.1 g/dL    Globulin, Total 2.6 1.9 - 3.7 g/dL (calc)    Albumin/Globulin Ratio 1.7 1.0 - 2.5 (calc)    Total Bilirubin 0.8 0.2 - 1.2 mg/dL    Alkaline Phosphatase 105 31 - 125 U/L    AST 16 10 - 30 U/L    ALT 16 6 - 29 U/L   Lipid Panel    Collection Time: 02/01/25 10:08 AM   Result Value Ref Range    Cholesterol 142 <200 mg/dL    HDL 79 > OR = 50 mg/dL    Triglycerides 61 <150 mg/dL    LDL Cholesterol 49 mg/dL (calc)    HDL/Cholesterol Ratio 1.8 <5.0 (calc)    Non HDL Chol. (LDL+VLDL) 63 <130 mg/dL (calc)   TSH w/reflex to FT4    Collection Time: 02/01/25 10:08 AM   Result Value Ref Range    TSH w/reflex to FT4 0.41 mIU/L           Review of patient's allergies indicates:  No Known Allergies  Current Outpatient Medications on File Prior to Visit   Medication Sig Dispense Refill    cetirizine (ZYRTEC) 5 MG chewable tablet Take 5 mg by mouth once daily.      ibuprofen (ADVIL,MOTRIN) 200 MG tablet Take 200 mg by mouth every 6 (six) hours as needed for Pain.      psyllium 0.52 gram capsule Take 0.52 g by mouth once daily.       No current  facility-administered medications on file prior to visit.     History reviewed. No pertinent past medical history.  Past Surgical History:   Procedure Laterality Date     SECTION       Family History   Problem Relation Name Age of Onset    Hyperlipidemia Mother      Hypertension Mother      Hyperlipidemia Father      Hypertension Father      COPD Father      Breast cancer Maternal Aunt      Ovarian cancer Maternal Aunt      Breast cancer Maternal Grandmother       Social History     Tobacco Use    Smoking status: Never    Smokeless tobacco: Never   Substance Use Topics    Alcohol use: Yes     Comment: occas    Drug use: No      Health Maintenance Topics with due status: Not Due       Topic Last Completion Date    TETANUS VACCINE 2023    Cervical Cancer Screening 2024    RSV Vaccine (Age 60+ and Pregnant patients) Not Due     Immunization History   Administered Date(s) Administered    DTaP (5 Pertussis Antigens) 10/31/1989, 1990, 1992, 1992, 1992    HIB 2000    Hepatitis B 2000, 10/25/2000    IPV 10/31/1989, 1990, 1992, 1992    MMR 1990, 1992    Tdap 2023       Review of Systems   Constitutional:  Negative for appetite change, fatigue, fever and unexpected weight change.   Respiratory:  Negative for cough, chest tightness, shortness of breath and wheezing.    Cardiovascular:  Negative for chest pain and leg swelling.   Gastrointestinal:  Negative for abdominal pain, constipation, nausea and vomiting.        -heartburn   Genitourinary:  Negative for difficulty urinating, dysuria, frequency and urgency.   Musculoskeletal:  Negative for arthralgias, back pain, myalgias and neck pain.        + right ankle pain   Skin:  Negative for rash.   Neurological:  Negative for dizziness, weakness, numbness and headaches.   Hematological:  Does not bruise/bleed easily.   Psychiatric/Behavioral:  Negative for dysphoric mood, sleep disturbance  "and suicidal ideas. The patient is nervous/anxious.    All other systems reviewed and are negative.     OBJECTIVE:      Vitals:    02/03/25 1513   BP: 110/74   Pulse: 82   SpO2: 99%   Weight: 102.5 kg (226 lb)   Height: 5' 5" (1.651 m)         Wt Readings from Last 3 Encounters:   02/03/25 102.5 kg (226 lb)   01/31/24 93.9 kg (207 lb)   07/12/23 96.6 kg (213 lb)       Body mass index is 37.61 kg/m².    Physical Exam  Vitals reviewed.   Constitutional:       General: She is not in acute distress.     Appearance: Normal appearance. She is well-developed.   HENT:      Head: Normocephalic and atraumatic.   Neck:      Thyroid: No thyromegaly.   Cardiovascular:      Rate and Rhythm: Normal rate and regular rhythm.      Heart sounds: Normal heart sounds. No murmur heard.     No friction rub.   Pulmonary:      Effort: Pulmonary effort is normal.      Breath sounds: Normal breath sounds. No wheezing or rales.   Abdominal:      General: Bowel sounds are normal. There is no distension.      Palpations: Abdomen is soft.      Tenderness: There is no abdominal tenderness.   Musculoskeletal:      Cervical back: Neck supple.   Lymphadenopathy:      Cervical: No cervical adenopathy.   Skin:     General: Skin is warm and dry.      Findings: No rash.   Neurological:      Mental Status: She is alert and oriented to person, place, and time.   Psychiatric:         Attention and Perception: She is attentive.         Speech: Speech normal.         Behavior: Behavior normal.         Thought Content: Thought content normal.         Judgment: Judgment normal.        Assessment:       1. Annual physical exam    2. Seasonal allergic rhinitis, unspecified trigger    3. Class 2 obesity with body mass index (BMI) of 37.0 to 37.9 in adult, unspecified obesity type, unspecified whether serious comorbidity present    4. Caregiver role strain    5. Encounter to discuss test results            Plan:       Annual physical exam    Seasonal allergic " rhinitis, unspecified trigger  Comments:  Intermittent. To take zyrtec as needed.    Class 2 obesity with body mass index (BMI) of 37.0 to 37.9 in adult, unspecified obesity type, unspecified whether serious comorbidity present  Comments:  Chronic. BMI 37.61. Discussed regular exercise and dietary discretion.    Caregiver role strain  Comments:  Provided reassurance.    Encounter to discuss test results        Other  Lab results discussed and reviewed with patient.    Counseled on age and gender appropriate medical preventative services, including cancer screenings, immunizations, overall nutritional health, need for a consistent exercise regimen and an overall push towards maintaining a vigorous and active lifestyle.      Follow up in about 1 year (around 2/3/2026) for Annual.

## 2025-02-03 ENCOUNTER — OFFICE VISIT (OUTPATIENT)
Dept: FAMILY MEDICINE | Facility: CLINIC | Age: 35
End: 2025-02-03
Payer: COMMERCIAL

## 2025-02-03 VITALS
SYSTOLIC BLOOD PRESSURE: 110 MMHG | HEIGHT: 65 IN | HEART RATE: 82 BPM | OXYGEN SATURATION: 99 % | BODY MASS INDEX: 37.65 KG/M2 | WEIGHT: 226 LBS | DIASTOLIC BLOOD PRESSURE: 74 MMHG

## 2025-02-03 DIAGNOSIS — Z63.8 CAREGIVER ROLE STRAIN: ICD-10-CM

## 2025-02-03 DIAGNOSIS — Z71.2 ENCOUNTER TO DISCUSS TEST RESULTS: ICD-10-CM

## 2025-02-03 DIAGNOSIS — Z00.00 ANNUAL PHYSICAL EXAM: Primary | ICD-10-CM

## 2025-02-03 DIAGNOSIS — J30.2 SEASONAL ALLERGIC RHINITIS, UNSPECIFIED TRIGGER: ICD-10-CM

## 2025-02-03 DIAGNOSIS — E66.812 CLASS 2 OBESITY WITH BODY MASS INDEX (BMI) OF 37.0 TO 37.9 IN ADULT, UNSPECIFIED OBESITY TYPE, UNSPECIFIED WHETHER SERIOUS COMORBIDITY PRESENT: ICD-10-CM

## 2025-02-03 PROCEDURE — 99395 PREV VISIT EST AGE 18-39: CPT | Mod: S$GLB,,, | Performed by: FAMILY MEDICINE

## 2025-02-03 PROCEDURE — 3078F DIAST BP <80 MM HG: CPT | Mod: CPTII,S$GLB,, | Performed by: FAMILY MEDICINE

## 2025-02-03 PROCEDURE — 3074F SYST BP LT 130 MM HG: CPT | Mod: CPTII,S$GLB,, | Performed by: FAMILY MEDICINE

## 2025-02-03 PROCEDURE — 1159F MED LIST DOCD IN RCRD: CPT | Mod: CPTII,S$GLB,, | Performed by: FAMILY MEDICINE

## 2025-02-03 PROCEDURE — 3008F BODY MASS INDEX DOCD: CPT | Mod: CPTII,S$GLB,, | Performed by: FAMILY MEDICINE

## 2025-02-03 PROCEDURE — 1160F RVW MEDS BY RX/DR IN RCRD: CPT | Mod: CPTII,S$GLB,, | Performed by: FAMILY MEDICINE

## 2025-02-03 RX ORDER — BROMPHENIRAMINE MALEATE, DEXTROMETHORPHAN HBR, PHENYLEPHRINE HCL, DIPHENHYDRAMINE HCL, PHENYLEPHRINE HCL 0.52G
0.52 KIT ORAL DAILY
COMMUNITY

## 2025-02-03 RX ORDER — IBUPROFEN 200 MG
200 TABLET ORAL EVERY 6 HOURS PRN
COMMUNITY

## 2025-02-03 RX ORDER — CETIRIZINE HYDROCHLORIDE 5 MG/1
5 TABLET, CHEWABLE ORAL DAILY
COMMUNITY

## 2025-02-03 SDOH — SOCIAL DETERMINANTS OF HEALTH (SDOH): OTHER SPECIFIED PROBLEMS RELATED TO PRIMARY SUPPORT GROUP: Z63.8
